# Patient Record
(demographics unavailable — no encounter records)

---

## 2025-01-23 NOTE — PHYSICAL EXAM
[Right] : right elbow [FreeTextEntry3] : Thickening of bursa. Small bursal effusion. Nontender  [FreeTextEntry9] : Full  [FreeTextEntry1] : Soft tissue swelling. Mild Degenerative changes

## 2025-01-23 NOTE — HISTORY OF PRESENT ILLNESS
[Sudden] : sudden [0] : 0 [de-identified] : 64 year old male thinks he struck his RIGHT elbow about 3 weeks ago.  Noted shortly thereafter that he had swelling over posterior elbow.  There is no pain.  Has had no intervention.  There is discomfort with direct pressure [] : no [FreeTextEntry1] : RT elbow  [FreeTextEntry3] : 01/01/25 [FreeTextEntry5] : NKI. Bump on his elbow happened new years molina  [de-identified] : Was on prednisone for unrelated issue.

## 2025-01-23 NOTE — DISCUSSION/SUMMARY
[de-identified] : Discussed the nature of the diagnosis and risk and benefits of different modalities of treatment. RT Olecranon bursitis  X rays reviewed & discussed Discussed conservative management as symptoms demand vs CSI. He will apply warm compresses & take OTC NSIAD's as needed He will avoid direct pressure Return criteria discussed PRN